# Patient Record
Sex: FEMALE | URBAN - METROPOLITAN AREA
[De-identification: names, ages, dates, MRNs, and addresses within clinical notes are randomized per-mention and may not be internally consistent; named-entity substitution may affect disease eponyms.]

---

## 2020-06-24 ENCOUNTER — APPOINTMENT (RX ONLY)
Dept: URBAN - METROPOLITAN AREA CLINIC 38 | Facility: CLINIC | Age: 60
Setting detail: DERMATOLOGY
End: 2020-06-24

## 2020-06-24 DIAGNOSIS — L82.1 OTHER SEBORRHEIC KERATOSIS: ICD-10-CM

## 2020-06-24 PROCEDURE — ? COUNSELING

## 2020-06-24 PROCEDURE — ? ADDITIONAL NOTES

## 2020-06-24 PROCEDURE — ? OBSERVATION

## 2020-06-24 PROCEDURE — 99202 OFFICE O/P NEW SF 15 MIN: CPT

## 2020-06-24 ASSESSMENT — LOCATION DETAILED DESCRIPTION DERM: LOCATION DETAILED: RIGHT SUPERIOR HELIX

## 2020-06-24 ASSESSMENT — LOCATION SIMPLE DESCRIPTION DERM: LOCATION SIMPLE: RIGHT EAR

## 2020-06-24 ASSESSMENT — LOCATION ZONE DERM: LOCATION ZONE: EAR

## 2020-06-24 NOTE — HPI: SKIN LESION
What Type Of Note Output Would You Prefer (Optional)?: Standard Output
How Severe Is Your Skin Lesion?: moderate
Has Your Skin Lesion Been Treated?: not been treated
Is This A New Presentation, Or A Follow-Up?: Skin Lesion
Additional History: Pts daughter noticed a spot on the top of her ear while coloring her hair. Pt stated it was not there 4 weeks ago. Pt stated it does not bother her.